# Patient Record
Sex: FEMALE | HISPANIC OR LATINO
[De-identification: names, ages, dates, MRNs, and addresses within clinical notes are randomized per-mention and may not be internally consistent; named-entity substitution may affect disease eponyms.]

---

## 2022-04-05 ENCOUNTER — APPOINTMENT (OUTPATIENT)
Dept: NEUROSURGERY | Facility: CLINIC | Age: 82
End: 2022-04-05
Payer: MEDICARE

## 2022-04-05 VITALS
DIASTOLIC BLOOD PRESSURE: 76 MMHG | HEART RATE: 63 BPM | HEIGHT: 59 IN | BODY MASS INDEX: 28.22 KG/M2 | SYSTOLIC BLOOD PRESSURE: 121 MMHG | OXYGEN SATURATION: 95 % | WEIGHT: 140 LBS | TEMPERATURE: 97.1 F

## 2022-04-05 DIAGNOSIS — M47.816 SPONDYLOSIS W/OUT MYELOPATHY OR RADICULOPATHY, LUMBAR REGION: ICD-10-CM

## 2022-04-05 DIAGNOSIS — M54.16 RADICULOPATHY, LUMBAR REGION: ICD-10-CM

## 2022-04-05 DIAGNOSIS — M48.00 SPINAL STENOSIS, SITE UNSPECIFIED: ICD-10-CM

## 2022-04-05 DIAGNOSIS — M51.36 OTHER INTERVERTEBRAL DISC DEGENERATION, LUMBAR REGION: ICD-10-CM

## 2022-04-05 PROCEDURE — 99203 OFFICE O/P NEW LOW 30 MIN: CPT

## 2022-04-06 NOTE — PHYSICAL EXAM
[General Appearance - Alert] : alert [General Appearance - In No Acute Distress] : in no acute distress [Oriented To Time, Place, And Person] : oriented to person, place, and time [Impaired Insight] : insight and judgment were intact [Affect] : the affect was normal [Sensation Tactile Decrease] : light touch was intact [Abnormal Walk] : normal gait [No Visual Abnormalities] : no visible abnormailities [Normal] : normal [4] : 4/5 Great Toe Extension (L5) [No Deficits] : no sensory deficits [5] : 5/5 Ankle Plantar Flexion (S1) [FreeTextEntry9] : Decreased patella reflexes due to TKR b/l  [Straight-Leg Raise Test - Left] : straight leg raise of the left leg was negative [Straight-Leg Raise Test - Right] : straight leg raise  of the right leg was negative

## 2022-04-06 NOTE — REVIEW OF SYSTEMS
[As Noted in HPI] : as noted in HPI [Abnormal Sensation] : an abnormal sensation [Negative] : Heme/Lymph [Leg Weakness] : no leg weakness [Numbness] : no numbness [Tingling] : no tingling [de-identified] : low back pain radiating to LE

## 2022-04-06 NOTE — HISTORY OF PRESENT ILLNESS
[FreeTextEntry1] : low back pain  [de-identified] : The patient is a 82 y/o, female, with hx of TKR both L and R, here today to establish care due to chronic low back pain. She reports her low back pain has been worsening over a year. Her pain radiates down b/l LE, > RLE posteriorly. She denies weakness, numbness, tingling of LE. She has urinary frequency, but denies incontinence. She has done PT for her lumbar spine, as well as received epidural injections without relief. She is currently on meloxicam, tramadol, and otc advil for her pain.

## 2022-04-06 NOTE — ASSESSMENT
[FreeTextEntry1] : The patient is a 80 y/o, female, with b/l TKR lumbar stenosis, radiculopathy, spondylosis, and degenerative lumbar spine. Imaging discussed with pt in detail. Pt has failed conservative management with PT and pain management. Will obtain lumbar spine with flex/extension to r/o instability.

## 2024-01-23 ENCOUNTER — APPOINTMENT (OUTPATIENT)
Dept: GYNECOLOGIC ONCOLOGY | Facility: CLINIC | Age: 84
End: 2024-01-23

## 2024-02-06 NOTE — CHIEF COMPLAINT
[FreeTextEntry1] : 24- No show  New Consult  82yo referred by Dr. Jarrell for Right complex cyst.   OBHX:  x 2 GYNHX:  PMHX: hypothyroid SX: Cholecystectomy, knee sx  MED: levothyroxine ALL:  SOCIAL: FAM: Breast Cancer: Mother dx  ; Sister  dx  Last Mammogram: Last pap: 2023- negative Last Colonoscopy:

## 2024-02-06 NOTE — HISTORY OF PRESENT ILLNESS
[FreeTextEntry1] : Problem: 1) RO complex cyst * : 32 nl  Previous Therapies: 1) in office TV[facility] 11/15/23     a) Uterus 4.5 x 2.3 x 4cm with small fibroid 1.2cm; EMS 0.3cm     b) RO 2.9cm with complex 2.6cm cyst     c) LO 1.3cm with simple 0.8cm cyst  2) Pelvic MRI 1/3/24     a) RO complex multilocular cystic mass measuring a combined 3.1 x 2.7 x 2.7cm. Of note, the intrinsic 2.6 x 1.8 x 1.4 cm circumscribed mass containing multiple hypointense internal septation and subcentimeter rounded multifocal T2 components is surrounded by cystic nodules. Septal enhancement without evidence of enhancing nodular component.     b) LO is essentially replaced by a multilocular complex cystic lesion measuring 1.9 x 1.8 x 1.6 cm. Multiple hypointense internal septation with a few rounded T2 hypointense components measuring up to 0.5 cm.. No enhancing mural nodule. **Bilateral complex multilocular cystic lesions of the ovaries. Considerations include adenofibroma and cystadenofibroma. Other neoplastic ovarian lesions are not excluded

## 2024-02-07 ENCOUNTER — NON-APPOINTMENT (OUTPATIENT)
Age: 84
End: 2024-02-07